# Patient Record
Sex: MALE | Race: WHITE | NOT HISPANIC OR LATINO | Employment: OTHER | URBAN - METROPOLITAN AREA
[De-identification: names, ages, dates, MRNs, and addresses within clinical notes are randomized per-mention and may not be internally consistent; named-entity substitution may affect disease eponyms.]

---

## 2017-01-06 ENCOUNTER — RECORDS - HEALTHEAST (OUTPATIENT)
Dept: LAB | Facility: CLINIC | Age: 82
End: 2017-01-06

## 2017-01-06 LAB
CHOLEST SERPL-MCNC: 166 MG/DL
FASTING STATUS PATIENT QL REPORTED: YES
HDLC SERPL-MCNC: 46 MG/DL
LDLC SERPL CALC-MCNC: 80 MG/DL
PSA SERPL-MCNC: <0.1 NG/ML (ref 0–6.5)
TRIGL SERPL-MCNC: 198 MG/DL

## 2017-11-24 ENCOUNTER — RECORDS - HEALTHEAST (OUTPATIENT)
Dept: LAB | Facility: CLINIC | Age: 82
End: 2017-11-24

## 2017-11-24 LAB
CHOLEST SERPL-MCNC: 170 MG/DL
FASTING STATUS PATIENT QL REPORTED: YES
HDLC SERPL-MCNC: 53 MG/DL
LDLC SERPL CALC-MCNC: 98 MG/DL
PSA SERPL-MCNC: <0.1 NG/ML (ref 0–6.5)
TRIGL SERPL-MCNC: 94 MG/DL

## 2018-11-27 ENCOUNTER — RECORDS - HEALTHEAST (OUTPATIENT)
Dept: LAB | Facility: CLINIC | Age: 83
End: 2018-11-27

## 2018-11-27 LAB
ANION GAP SERPL CALCULATED.3IONS-SCNC: 10 MMOL/L (ref 5–18)
BUN SERPL-MCNC: 19 MG/DL (ref 8–28)
CALCIUM SERPL-MCNC: 9.5 MG/DL (ref 8.5–10.5)
CHLORIDE BLD-SCNC: 104 MMOL/L (ref 98–107)
CHOLEST SERPL-MCNC: 200 MG/DL
CO2 SERPL-SCNC: 27 MMOL/L (ref 22–31)
CREAT SERPL-MCNC: 1.11 MG/DL (ref 0.7–1.3)
FASTING STATUS PATIENT QL REPORTED: YES
GFR SERPL CREATININE-BSD FRML MDRD: >60 ML/MIN/1.73M2
GLUCOSE BLD-MCNC: 104 MG/DL (ref 70–125)
HDLC SERPL-MCNC: 48 MG/DL
LDLC SERPL CALC-MCNC: 116 MG/DL
POTASSIUM BLD-SCNC: 3.9 MMOL/L (ref 3.5–5)
PSA SERPL-MCNC: <0.1 NG/ML (ref 0–6.5)
SODIUM SERPL-SCNC: 141 MMOL/L (ref 136–145)
TRIGL SERPL-MCNC: 178 MG/DL

## 2020-01-09 ENCOUNTER — RECORDS - HEALTHEAST (OUTPATIENT)
Dept: LAB | Facility: CLINIC | Age: 85
End: 2020-01-09

## 2020-01-09 LAB
ANION GAP SERPL CALCULATED.3IONS-SCNC: 9 MMOL/L (ref 5–18)
BUN SERPL-MCNC: 23 MG/DL (ref 8–28)
CALCIUM SERPL-MCNC: 9.1 MG/DL (ref 8.5–10.5)
CHLORIDE BLD-SCNC: 105 MMOL/L (ref 98–107)
CHOLEST SERPL-MCNC: 211 MG/DL
CO2 SERPL-SCNC: 29 MMOL/L (ref 22–31)
CREAT SERPL-MCNC: 1.12 MG/DL (ref 0.7–1.3)
FASTING STATUS PATIENT QL REPORTED: YES
GFR SERPL CREATININE-BSD FRML MDRD: >60 ML/MIN/1.73M2
GLUCOSE BLD-MCNC: 100 MG/DL (ref 70–125)
HDLC SERPL-MCNC: 52 MG/DL
LDLC SERPL CALC-MCNC: 126 MG/DL
POTASSIUM BLD-SCNC: 3.8 MMOL/L (ref 3.5–5)
PSA SERPL-MCNC: <0.1 NG/ML (ref 0–6.5)
SODIUM SERPL-SCNC: 143 MMOL/L (ref 136–145)
TRIGL SERPL-MCNC: 163 MG/DL

## 2020-06-10 ENCOUNTER — RECORDS - HEALTHEAST (OUTPATIENT)
Dept: LAB | Facility: CLINIC | Age: 85
End: 2020-06-10

## 2020-06-10 LAB
ANION GAP SERPL CALCULATED.3IONS-SCNC: 9 MMOL/L (ref 5–18)
BUN SERPL-MCNC: 18 MG/DL (ref 8–28)
CALCIUM SERPL-MCNC: 9.2 MG/DL (ref 8.5–10.5)
CHLORIDE BLD-SCNC: 105 MMOL/L (ref 98–107)
CO2 SERPL-SCNC: 28 MMOL/L (ref 22–31)
CREAT SERPL-MCNC: 1.05 MG/DL (ref 0.7–1.3)
GFR SERPL CREATININE-BSD FRML MDRD: >60 ML/MIN/1.73M2
GLUCOSE BLD-MCNC: 95 MG/DL (ref 70–125)
POTASSIUM BLD-SCNC: 3.9 MMOL/L (ref 3.5–5)
SODIUM SERPL-SCNC: 142 MMOL/L (ref 136–145)

## 2021-04-21 ENCOUNTER — TRANSFERRED RECORDS (OUTPATIENT)
Dept: HEALTH INFORMATION MANAGEMENT | Facility: CLINIC | Age: 86
End: 2021-04-21

## 2021-04-21 ENCOUNTER — RECORDS - HEALTHEAST (OUTPATIENT)
Dept: LAB | Facility: CLINIC | Age: 86
End: 2021-04-21

## 2021-04-21 LAB
ANION GAP SERPL CALCULATED.3IONS-SCNC: 11 MMOL/L (ref 5–18)
BUN SERPL-MCNC: 23 MG/DL (ref 8–28)
CALCIUM SERPL-MCNC: 8.6 MG/DL (ref 8.5–10.5)
CHLORIDE BLD-SCNC: 106 MMOL/L (ref 98–107)
CHOLEST SERPL-MCNC: 153 MG/DL
CO2 SERPL-SCNC: 25 MMOL/L (ref 22–31)
CREAT SERPL-MCNC: 0.97 MG/DL (ref 0.7–1.3)
FASTING STATUS PATIENT QL REPORTED: NO
GFR SERPL CREATININE-BSD FRML MDRD: >60 ML/MIN/1.73M2
GLUCOSE BLD-MCNC: 176 MG/DL (ref 70–125)
HDLC SERPL-MCNC: 45 MG/DL
LDLC SERPL CALC-MCNC: 91 MG/DL
PHQ9 SCORE: 0
POTASSIUM BLD-SCNC: 3.7 MMOL/L (ref 3.5–5)
PSA SERPL-MCNC: <0.1 NG/ML (ref 0–6.5)
SODIUM SERPL-SCNC: 142 MMOL/L (ref 136–145)
TRIGL SERPL-MCNC: 84 MG/DL

## 2021-05-25 ENCOUNTER — RECORDS - HEALTHEAST (OUTPATIENT)
Dept: ADMINISTRATIVE | Facility: CLINIC | Age: 86
End: 2021-05-25

## 2021-05-28 ENCOUNTER — RECORDS - HEALTHEAST (OUTPATIENT)
Dept: ADMINISTRATIVE | Facility: CLINIC | Age: 86
End: 2021-05-28

## 2022-06-26 ENCOUNTER — APPOINTMENT (OUTPATIENT)
Dept: GENERAL RADIOLOGY | Facility: CLINIC | Age: 87
End: 2022-06-26
Attending: EMERGENCY MEDICINE
Payer: COMMERCIAL

## 2022-06-26 ENCOUNTER — APPOINTMENT (OUTPATIENT)
Dept: CT IMAGING | Facility: CLINIC | Age: 87
End: 2022-06-26
Attending: EMERGENCY MEDICINE
Payer: COMMERCIAL

## 2022-06-26 ENCOUNTER — HOSPITAL ENCOUNTER (OUTPATIENT)
Facility: CLINIC | Age: 87
Setting detail: OBSERVATION
Discharge: HOME | End: 2022-06-27
Attending: EMERGENCY MEDICINE | Admitting: PHYSICIAN ASSISTANT
Payer: COMMERCIAL

## 2022-06-26 DIAGNOSIS — R55 SYNCOPE, UNSPECIFIED SYNCOPE TYPE: ICD-10-CM

## 2022-06-26 DIAGNOSIS — Z20.822 COVID-19 RULED OUT BY LABORATORY TESTING: ICD-10-CM
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
## 2022-06-26 DIAGNOSIS — S01.01XA LACERATION OF SCALP, INITIAL ENCOUNTER: ICD-10-CM

## 2022-06-26 LAB
ALBUMIN SERPL BCG-MCNC: 3.9 G/DL (ref 3.5–5.2)
ALBUMIN UR-MCNC: NEGATIVE MG/DL
ALP SERPL-CCNC: 89 U/L (ref 40–129)
ALT SERPL W P-5'-P-CCNC: 17 U/L (ref 10–50)
ANION GAP SERPL CALCULATED.3IONS-SCNC: 13 MMOL/L (ref 7–15)
APPEARANCE UR: CLEAR
AST SERPL W P-5'-P-CCNC: 59 U/L (ref 10–50)
BASOPHILS # BLD AUTO: 0 10E3/UL (ref 0–0.2)
BASOPHILS NFR BLD AUTO: 1 %
BILIRUB DIRECT SERPL-MCNC: <0.2 MG/DL (ref 0–0.3)
BILIRUB SERPL-MCNC: 0.9 MG/DL
BILIRUB UR QL STRIP: NEGATIVE
BUN SERPL-MCNC: 22.1 MG/DL (ref 8–23)
CALCIUM SERPL-MCNC: 9.2 MG/DL (ref 8.2–9.6)
CHLORIDE SERPL-SCNC: 104 MMOL/L (ref 98–107)
COLOR UR AUTO: ABNORMAL
CREAT SERPL-MCNC: 1.28 MG/DL (ref 0.67–1.17)
DEPRECATED HCO3 PLAS-SCNC: 24 MMOL/L (ref 22–29)
EOSINOPHIL # BLD AUTO: 0.1 10E3/UL (ref 0–0.7)
EOSINOPHIL NFR BLD AUTO: 1 %
ERYTHROCYTE [DISTWIDTH] IN BLOOD BY AUTOMATED COUNT: 13.2 % (ref 10–15)
GFR SERPL CREATININE-BSD FRML MDRD: 53 ML/MIN/1.73M2
GLUCOSE SERPL-MCNC: 115 MG/DL (ref 70–99)
GLUCOSE UR STRIP-MCNC: NEGATIVE MG/DL
HCT VFR BLD AUTO: 38.6 % (ref 40–53)
HGB BLD-MCNC: 12.9 G/DL (ref 13.3–17.7)
HGB UR QL STRIP: NEGATIVE
HOLD SPECIMEN: NORMAL
HYALINE CASTS: 1 /LPF
IMM GRANULOCYTES # BLD: 0 10E3/UL
IMM GRANULOCYTES NFR BLD: 1 %
KETONES UR STRIP-MCNC: ABNORMAL MG/DL
LEUKOCYTE ESTERASE UR QL STRIP: NEGATIVE
LYMPHOCYTES # BLD AUTO: 0.9 10E3/UL (ref 0.8–5.3)
LYMPHOCYTES NFR BLD AUTO: 11 %
MAGNESIUM SERPL-MCNC: 2 MG/DL (ref 1.7–2.3)
MCH RBC QN AUTO: 30.4 PG (ref 26.5–33)
MCHC RBC AUTO-ENTMCNC: 33.4 G/DL (ref 31.5–36.5)
MCV RBC AUTO: 91 FL (ref 78–100)
MONOCYTES # BLD AUTO: 0.6 10E3/UL (ref 0–1.3)
MONOCYTES NFR BLD AUTO: 7 %
MUCOUS THREADS #/AREA URNS LPF: PRESENT /LPF
NEUTROPHILS # BLD AUTO: 6.4 10E3/UL (ref 1.6–8.3)
NEUTROPHILS NFR BLD AUTO: 79 %
NITRATE UR QL: NEGATIVE
NRBC # BLD AUTO: 0 10E3/UL
NRBC BLD AUTO-RTO: 0 /100
PH UR STRIP: 6.5 [PH] (ref 5–7)
PHOSPHATE SERPL-MCNC: 3.2 MG/DL (ref 2.5–4.5)
PLATELET # BLD AUTO: 191 10E3/UL (ref 150–450)
POTASSIUM SERPL-SCNC: 3.9 MMOL/L (ref 3.4–5.3)
PROT SERPL-MCNC: 7 G/DL (ref 6.4–8.3)
RBC # BLD AUTO: 4.25 10E6/UL (ref 4.4–5.9)
RBC URINE: 1 /HPF
SARS-COV-2 RNA RESP QL NAA+PROBE: NEGATIVE
SODIUM SERPL-SCNC: 141 MMOL/L (ref 136–145)
SP GR UR STRIP: 1.01 (ref 1–1.03)
TROPONIN T SERPL HS-MCNC: 26 NG/L
TROPONIN T SERPL HS-MCNC: 26 NG/L
TROPONIN T SERPL HS-MCNC: 28 NG/L
TSH SERPL DL<=0.005 MIU/L-ACNC: 3.75 UIU/ML (ref 0.3–4.2)
UROBILINOGEN UR STRIP-MCNC: NORMAL MG/DL
WBC # BLD AUTO: 7.9 10E3/UL (ref 4–11)
WBC URINE: 4 /HPF

## 2022-06-26 PROCEDURE — 93005 ELECTROCARDIOGRAM TRACING: CPT | Performed by: EMERGENCY MEDICINE

## 2022-06-26 PROCEDURE — 99285 EMERGENCY DEPT VISIT HI MDM: CPT | Mod: 25 | Performed by: EMERGENCY MEDICINE

## 2022-06-26 PROCEDURE — 71045 X-RAY EXAM CHEST 1 VIEW: CPT

## 2022-06-26 PROCEDURE — 80076 HEPATIC FUNCTION PANEL: CPT | Performed by: PHYSICIAN ASSISTANT

## 2022-06-26 PROCEDURE — 99219 PR INITIAL OBSERVATION CARE,LEVEL II: CPT | Performed by: PHYSICIAN ASSISTANT

## 2022-06-26 PROCEDURE — 72125 CT NECK SPINE W/O DYE: CPT

## 2022-06-26 PROCEDURE — 36415 COLL VENOUS BLD VENIPUNCTURE: CPT | Performed by: PHYSICIAN ASSISTANT

## 2022-06-26 PROCEDURE — 84484 ASSAY OF TROPONIN QUANT: CPT | Performed by: PHYSICIAN ASSISTANT

## 2022-06-26 PROCEDURE — G0378 HOSPITAL OBSERVATION PER HR: HCPCS

## 2022-06-26 PROCEDURE — 72125 CT NECK SPINE W/O DYE: CPT | Mod: 26 | Performed by: RADIOLOGY

## 2022-06-26 PROCEDURE — 12001 RPR S/N/AX/GEN/TRNK 2.5CM/<: CPT | Performed by: EMERGENCY MEDICINE

## 2022-06-26 PROCEDURE — 93010 ELECTROCARDIOGRAM REPORT: CPT | Mod: 59 | Performed by: EMERGENCY MEDICINE

## 2022-06-26 PROCEDURE — 85025 COMPLETE CBC W/AUTO DIFF WBC: CPT | Performed by: EMERGENCY MEDICINE

## 2022-06-26 PROCEDURE — 96360 HYDRATION IV INFUSION INIT: CPT | Performed by: EMERGENCY MEDICINE

## 2022-06-26 PROCEDURE — 84443 ASSAY THYROID STIM HORMONE: CPT | Performed by: PHYSICIAN ASSISTANT

## 2022-06-26 PROCEDURE — 36415 COLL VENOUS BLD VENIPUNCTURE: CPT | Performed by: EMERGENCY MEDICINE

## 2022-06-26 PROCEDURE — 84484 ASSAY OF TROPONIN QUANT: CPT | Performed by: EMERGENCY MEDICINE

## 2022-06-26 PROCEDURE — U0005 INFEC AGEN DETEC AMPLI PROBE: HCPCS | Performed by: EMERGENCY MEDICINE

## 2022-06-26 PROCEDURE — 71045 X-RAY EXAM CHEST 1 VIEW: CPT | Mod: 26 | Performed by: RADIOLOGY

## 2022-06-26 PROCEDURE — 93005 ELECTROCARDIOGRAM TRACING: CPT | Mod: 76 | Performed by: EMERGENCY MEDICINE

## 2022-06-26 PROCEDURE — 70450 CT HEAD/BRAIN W/O DYE: CPT | Mod: 26 | Performed by: RADIOLOGY

## 2022-06-26 PROCEDURE — 81001 URINALYSIS AUTO W/SCOPE: CPT | Performed by: EMERGENCY MEDICINE

## 2022-06-26 PROCEDURE — 250N000013 HC RX MED GY IP 250 OP 250 PS 637: Performed by: PHYSICIAN ASSISTANT

## 2022-06-26 PROCEDURE — 83735 ASSAY OF MAGNESIUM: CPT | Performed by: PHYSICIAN ASSISTANT

## 2022-06-26 PROCEDURE — 82310 ASSAY OF CALCIUM: CPT | Performed by: EMERGENCY MEDICINE

## 2022-06-26 PROCEDURE — 70450 CT HEAD/BRAIN W/O DYE: CPT

## 2022-06-26 PROCEDURE — 84100 ASSAY OF PHOSPHORUS: CPT | Performed by: PHYSICIAN ASSISTANT

## 2022-06-26 PROCEDURE — C9803 HOPD COVID-19 SPEC COLLECT: HCPCS | Performed by: EMERGENCY MEDICINE

## 2022-06-26 PROCEDURE — 258N000003 HC RX IP 258 OP 636: Performed by: EMERGENCY MEDICINE

## 2022-06-26 RX ORDER — NITROGLYCERIN 0.4 MG/1
0.4 TABLET SUBLINGUAL EVERY 5 MIN PRN
Status: DISCONTINUED | OUTPATIENT
Start: 2022-06-26 | End: 2022-06-27 | Stop reason: HOSPADM

## 2022-06-26 RX ORDER — ONDANSETRON 2 MG/ML
4 INJECTION INTRAMUSCULAR; INTRAVENOUS EVERY 6 HOURS PRN
Status: DISCONTINUED | OUTPATIENT
Start: 2022-06-26 | End: 2022-06-27 | Stop reason: HOSPADM

## 2022-06-26 RX ORDER — ACETAMINOPHEN 325 MG/1
650 TABLET ORAL EVERY 4 HOURS PRN
Status: DISCONTINUED | OUTPATIENT
Start: 2022-06-26 | End: 2022-06-27 | Stop reason: HOSPADM

## 2022-06-26 RX ORDER — AMLODIPINE BESYLATE 10 MG/1
1 TABLET ORAL DAILY
COMMUNITY

## 2022-06-26 RX ORDER — AMLODIPINE BESYLATE 10 MG/1
10 TABLET ORAL DAILY
Status: DISCONTINUED | OUTPATIENT
Start: 2022-06-26 | End: 2022-06-27 | Stop reason: HOSPADM

## 2022-06-26 RX ORDER — SIMVASTATIN 10 MG
20 TABLET ORAL AT BEDTIME
Status: DISCONTINUED | OUTPATIENT
Start: 2022-06-26 | End: 2022-06-27 | Stop reason: HOSPADM

## 2022-06-26 RX ORDER — DORZOLAMIDE HYDROCHLORIDE AND TIMOLOL MALEATE PRESERVATIVE FREE 20; 5 MG/ML; MG/ML
1 SOLUTION/ DROPS OPHTHALMIC 2 TIMES DAILY
Status: DISCONTINUED | OUTPATIENT
Start: 2022-06-26 | End: 2022-06-27 | Stop reason: HOSPADM

## 2022-06-26 RX ORDER — LIDOCAINE 40 MG/G
CREAM TOPICAL
Status: DISCONTINUED | OUTPATIENT
Start: 2022-06-26 | End: 2022-06-27 | Stop reason: HOSPADM

## 2022-06-26 RX ORDER — ONDANSETRON 4 MG/1
4 TABLET, ORALLY DISINTEGRATING ORAL EVERY 6 HOURS PRN
Status: DISCONTINUED | OUTPATIENT
Start: 2022-06-26 | End: 2022-06-27 | Stop reason: HOSPADM

## 2022-06-26 RX ORDER — FLUOXETINE 10 MG/1
10 CAPSULE ORAL DAILY
Status: DISCONTINUED | OUTPATIENT
Start: 2022-06-27 | End: 2022-06-26

## 2022-06-26 RX ORDER — DORZOLAMIDE HYDROCHLORIDE AND TIMOLOL MALEATE PRESERVATIVE FREE 20; 5 MG/ML; MG/ML
1 SOLUTION/ DROPS OPHTHALMIC 2 TIMES DAILY
COMMUNITY

## 2022-06-26 RX ORDER — SIMVASTATIN 20 MG
1 TABLET ORAL AT BEDTIME
COMMUNITY

## 2022-06-26 RX ORDER — FLUOXETINE 10 MG/1
10 CAPSULE ORAL EVERY OTHER DAY
Status: DISCONTINUED | OUTPATIENT
Start: 2022-06-27 | End: 2022-06-27 | Stop reason: HOSPADM

## 2022-06-26 RX ORDER — ASPIRIN 81 MG/1
81 TABLET ORAL DAILY
Status: DISCONTINUED | OUTPATIENT
Start: 2022-06-27 | End: 2022-06-27 | Stop reason: HOSPADM

## 2022-06-26 RX ORDER — NITROGLYCERIN 0.4 MG/1
1 TABLET SUBLINGUAL PRN
COMMUNITY

## 2022-06-26 RX ORDER — LATANOPROST 50 UG/ML
1 SOLUTION/ DROPS OPHTHALMIC AT BEDTIME
Status: DISCONTINUED | OUTPATIENT
Start: 2022-06-26 | End: 2022-06-27 | Stop reason: HOSPADM

## 2022-06-26 RX ORDER — ACETAMINOPHEN 650 MG/1
650 SUPPOSITORY RECTAL EVERY 4 HOURS PRN
Status: DISCONTINUED | OUTPATIENT
Start: 2022-06-26 | End: 2022-06-27 | Stop reason: HOSPADM

## 2022-06-26 RX ORDER — ASPIRIN 81 MG/1
1 TABLET ORAL DAILY
COMMUNITY

## 2022-06-26 RX ORDER — SODIUM CHLORIDE 9 MG/ML
INJECTION, SOLUTION INTRAVENOUS CONTINUOUS
Status: DISCONTINUED | OUTPATIENT
Start: 2022-06-26 | End: 2022-06-27 | Stop reason: HOSPADM

## 2022-06-26 RX ORDER — LATANOPROST 50 UG/ML
1 SOLUTION/ DROPS OPHTHALMIC AT BEDTIME
COMMUNITY

## 2022-06-26 RX ORDER — FLUOXETINE 10 MG/1
1 CAPSULE ORAL EVERY OTHER DAY
COMMUNITY

## 2022-06-26 RX ORDER — ANTIOX #8/OM3/DHA/EPA/LUT/ZEAX 250-2.5 MG
1 CAPSULE ORAL 2 TIMES DAILY
COMMUNITY

## 2022-06-26 RX ADMIN — SODIUM CHLORIDE 1000 ML: 9 INJECTION, SOLUTION INTRAVENOUS at 18:18

## 2022-06-26 RX ADMIN — SIMVASTATIN 20 MG: 20 TABLET, FILM COATED ORAL at 22:06

## 2022-06-26 RX ADMIN — AMLODIPINE BESYLATE 10 MG: 10 TABLET ORAL at 20:47

## 2022-06-26 NOTE — ED NOTES
Bed: ED01  Expected date: 6/26/22  Expected time: 3:41 PM  Means of arrival: Ambulance  Comments:  [address]    89 y/o Male    Fall  Head injury/head laceration  Unknown thinners

## 2022-06-26 NOTE — ED PROVIDER NOTES
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 Per patient's wife, he left his house and was walking around outside for 3 hours when a passerby found him lying on the street (fall was not witnessed). Patient is unstable when walking. Patient suffered a laceration to the occipital region of his head. Patient is unable to provide thorough history due to dementia and does not remember the fall. Says he is feeling okay right now and is not having any pain.  No fevers.    Past Medical History  Past Medical History:   Diagnosis Date     Dementia (H)      No past surgical history on file.  No current outpatient medications on file.    Allergies   Allergen Reactions     Lisinopril-Hydrochlorothiazide Unknown     Family History  No family history on file.  Social History       Past medical history, past surgical history, medications, allergies, family history, and social history were reviewed with the patient. No additional pertinent items.       Review of Systems   Unable to perform ROS: Dementia     A complete review of systems was performed with pertinent positives and negatives noted in the HPI, and all other systems negative.    Physical Exam   BP: (!) 166/80  Pulse: 77  Temp: 98.6  F (37  C)  Resp: 15  SpO2: 98 %  Physical Exam  Vitals and nursing note reviewed.   Constitutional:       General: He is not in acute distress.     Appearance: Normal appearance. He is not ill-appearing, toxic-appearing or diaphoretic.   HENT:      Head: Normocephalic.      Comments: 2 cm linear laceration in the occipital region without galea exposure without active bleeding.  No foreign body in the wound.     Mouth/Throat:       Mouth: Mucous membranes are moist.      Pharynx: Oropharynx is clear.   Eyes:      Extraocular Movements: Extraocular movements intact.      Conjunctiva/sclera: Conjunctivae normal.      Pupils: Pupils are equal, round, and reactive to light.   Cardiovascular:      Rate and Rhythm: Normal rate and regular rhythm.      Pulses: Normal pulses.           Radial pulses are 2+ on the right side and 2+ on the left side.        Dorsalis pedis pulses are 2+ on the right side and 2+ on the left side.        Posterior tibial pulses are 2+ on the right side and 2+ on the left side.      Heart sounds: Normal heart sounds.   Pulmonary:      Effort: Pulmonary effort is normal. No respiratory distress.      Breath sounds: Normal breath sounds.   Chest:      Chest wall: No tenderness.   Abdominal:      General: Abdomen is flat.      Palpations: Abdomen is soft.      Tenderness: There is no abdominal tenderness. There is no guarding or rebound.   Musculoskeletal:         General: No tenderness. Normal range of motion.      Cervical back: Full passive range of motion without pain, normal range of motion and neck supple. No rigidity or tenderness. No pain with movement, spinous process tenderness or muscular tenderness. Normal range of motion.      Thoracic back: No tenderness.      Lumbar back: No tenderness.      Comments: No midline cervical, thoracic, lumbar tenderness.  No tenderness over hips.  Full range of motion, active and passive, in shoulders, elbows, wrists, hips, knees, and ankles without pain.   Skin:     General: Skin is warm.      Capillary Refill: Capillary refill takes less than 2 seconds.      Findings: No abrasion or laceration.   Neurological:      General: No focal deficit present.      Mental Status: He is alert.      GCS: GCS eye subscore is 4. GCS verbal subscore is 4. GCS motor subscore is 6.      Cranial Nerves: No cranial nerve deficit.      Sensory: Sensation is intact. No sensory deficit.      Motor:  Motor function is intact. No weakness.      Coordination: Coordination is intact. Coordination normal.      Gait: Gait is intact.      Comments: Patient oriented to place, person.   Psychiatric:         Mood and Affect: Mood normal.         Behavior: Behavior normal.         ED Course     3:44 PM  The patient was seen and examined by Dr. Celine Tan MD in Room ED01.       Procedures       Narrative: Procedure: Laceration Repair        LACERATION:  A simple and superficial clean 2 cm laceration.      LOCATION:  Posterior scalp      FUNCTION:  Distally sensation, circulation, motor and tendon function are intact.      ANESTHESIA:  None      PREPARATION:  Irrigation with Normal Saline      DEBRIDEMENT:  no debridement and wound explored, no foreign body found      CLOSURE:  Wound was closed with 2 Staples                  EKG Interpretation:      Interpreted by Celine Tan MD  Time reviewed: 4:23 PM  Symptoms at time of EKG: Possible syncope  Rhythm: normal sinus   Rate: normal  Axis: normal  Ectopy: none  Conduction: normal  ST Segments/ T Waves: T wave inversions in leads III and aVF  Q Waves: none  Comparison to prior: Slightly different than old EKG done on July 25, 2013    Clinical Impression: NSR with likely chronic ischemic changes in inferior leads                    Results for orders placed or performed during the hospital encounter of 06/26/22   XR Chest Port 1 View     Status: None    Narrative    EXAM: XR CHEST PORT 1 VIEW  6/26/2022 4:12 PM     HISTORY:  fall, trauma       COMPARISON:  None    FINDINGS: Single view of the chest.    Trachea is midline. The cardiomediastinal silhouette is within normal  limits. No significant pleural effusion or pneumothorax. No focal  airspace opacity. The visualized upper abdomen is unremarkable. No  acute osseous injury.      Impression    IMPRESSION: No acute airspace disease.    I have personally reviewed the examination and initial interpretation  and I agree  with the findings.    JUNIE HARRIS MD         SYSTEM ID:  [id number]   CT Head w/o Contrast     Status: None    Narrative    CT HEAD W/O CONTRAST 6/26/2022 4:40 PM    History: fall, head trauma     Comparison: None    Technique: Using multidetector thin collimation helical acquisition  technique, axial, coronal and sagittal CT images from the skull base  to the vertex were obtained without intravenous contrast.   (topogram) image(s) also obtained and reviewed.    Findings: There is no intracranial hemorrhage, mass effect, or midline  shift. Gray/white matter differentiation in both cerebral hemispheres  is preserved. Ventricles are proportionate to the cerebral sulci. The  basal cisterns are clear. Low-attenuation in the periventricular and  subcortical white matter, likely representing chronic small vessel  ischemic disease. Moderate diffuse cerebral volume loss.    Scalp hematoma overlying the right parietal bone. The bony calvaria  and the bones of the skull base are normal. The visualized portions of  the paranasal sinuses and mastoid air cells are clear.      Impression    Impression: No acute intracranial pathology.     I have personally reviewed the examination and initial interpretation  and I agree with the findings.    CHI HOFFMANN MD         SYSTEM ID:  [id number]   Cervical spine CT w/o contrast     Status: None    Narrative    CT CERVICAL SPINE W/O CONTRAST 6/26/2022 4:40 PM    Provided History: fall, possible c-sine injury, evaluate for c-spine  fracture    Comparison: None    Technique: Using multidetector thin collimation helical acquisition  technique, axial, coronal and sagittal CT images through the cervical  spine were obtained without intravenous contrast.     Findings:  Grade 1 anterolisthesis of C4 on C5 and C7 on T1. Accentuation of the  normal cervical lordosis. No acute fracture or subluxation. No  prevertebral edema. There is no disc height narrowing at any level.  The findings on a  level by level basis are as follows:    C2-3:  No significant spinal canal or neural foraminal stenosis.    C3-4:  Bilateral uncinate spurring and facet arthropathy. Moderate  right and moderate-severe left neural foraminal stenosis. Mild spinal  canal stenosis.    C4-5:  Bilateral uncinate spurring and facet arthropathy. Moderate  right and mild left neural foraminal stenosis. No significant spinal  canal stenosis    C5-6:  Posterior disc osteophyte complex with bilateral uncinate  spurring and facet arthropathy. Moderate-severe right and moderate  left neural foraminal stenosis. Mild spinal canal stenosis.    C6-7:  Posterior disc osteophyte complex with bilateral uncinate  spurring and facet arthropathy. Severe right and moderate-severe left  neural foraminal stenosis. Mild-moderate spinal canal stenosis    C7-T1:  No significant spinal canal or neural foraminal stenosis     No abnormality of the paraspinous soft tissues.      Impression    Impression:  No acute fracture or traumatic subluxation of the  cervical spine.    I have personally reviewed the examination and initial interpretation  and I agree with the findings.    CHI HOFFMANN MD         SYSTEM ID:  [id number]   Basic metabolic panel     Status: Abnormal   Result Value Ref Range    Creatinine 1.28 (H) 0.67 - 1.17 mg/dL    Sodium 141 136 - 145 mmol/L    Potassium 3.9 3.4 - 5.3 mmol/L    Urea Nitrogen 22.1 8.0 - 23.0 mg/dL    Chloride 104 98 - 107 mmol/L    Carbon Dioxide (CO2) 24 22 - 29 mmol/L    Anion Gap 13 7 - 15 mmol/L    Glucose 115 (H) 70 - 99 mg/dL    GFR Estimate 53 (L) >60 mL/min/1.73m2    Calcium 9.2 8.2 - 9.6 mg/dL   Troponin T, High Sensitivity     Status: Abnormal   Result Value Ref Range    Troponin T, High Sensitivity 28 (H) <=22 ng/L   UA with Microscopic reflex to Culture     Status: Abnormal    Specimen: Urine, Clean Catch   Result Value Ref Range    Color Urine Light Yellow Colorless, Straw, Light Yellow, Yellow    Appearance Urine  Clear Clear    Glucose Urine Negative Negative mg/dL    Bilirubin Urine Negative Negative    Ketones Urine Trace (A) Negative mg/dL    Specific Gravity Urine 1.012 1.003 - 1.035    Blood Urine Negative Negative    pH Urine 6.5 5.0 - 7.0    Protein Albumin Urine Negative Negative mg/dL    Urobilinogen Urine Normal Normal, 2.0 mg/dL    Nitrite Urine Negative Negative    Leukocyte Esterase Urine Negative Negative    Mucus Urine Present (A) None Seen /LPF    RBC Urine 1 <=2 /HPF    WBC Urine 4 <=5 /HPF    Hyaline Casts Urine 1 <=2 /LPF    Narrative    Urine Culture not indicated   [place] Draw     Status: None    Narrative    The following orders were created for panel order [place] Draw.  Procedure                               Abnormality         Status                     ---------                               -----------         ------                     Extra Blue Top Tube[[id number]]                              Final result                 Please view results for these tests on the individual orders.   CBC with platelets and differential     Status: Abnormal   Result Value Ref Range    WBC Count 7.9 4.0 - 11.0 10e3/uL    RBC Count 4.25 (L) 4.40 - 5.90 10e6/uL    Hemoglobin 12.9 (L) 13.3 - 17.7 g/dL    Hematocrit 38.6 (L) 40.0 - 53.0 %    MCV 91 78 - 100 fL    MCH 30.4 26.5 - 33.0 pg    MCHC 33.4 31.5 - 36.5 g/dL    RDW 13.2 10.0 - 15.0 %    Platelet Count 191 150 - 450 10e3/uL    % Neutrophils 79 %    % Lymphocytes 11 %    % Monocytes 7 %    % Eosinophils 1 %    % Basophils 1 %    % Immature Granulocytes 1 %    NRBCs per 100 WBC 0 <1 /100    Absolute Neutrophils 6.4 1.6 - 8.3 10e3/uL    Absolute Lymphocytes 0.9 0.8 - 5.3 10e3/uL    Absolute Monocytes 0.6 0.0 - 1.3 10e3/uL    Absolute Eosinophils 0.1 0.0 - 0.7 10e3/uL    Absolute Basophils 0.0 0.0 - 0.2 10e3/uL    Absolute Immature Granulocytes 0.0 <=0.4 10e3/uL    Absolute NRBCs 0.0 10e3/uL   Extra Blue Top Tube     Status: None   Result Value Ref Range    Hold  Specimen JI    Troponin T, High Sensitivity     Status: Abnormal   Result Value Ref Range    Troponin T, High Sensitivity 26 (H) <=22 ng/L   Asymptomatic COVID-19 Virus (Coronavirus) by PCR Nasopharyngeal     Status: Normal    Specimen: Nasopharyngeal; Swab   Result Value Ref Range    SARS CoV2 PCR Negative Negative, Testing sent to reference lab. Results will be returned via unsolicited result    Narrative    Testing was performed using the Xpert Xpress SARS-CoV-2 Assay on the  Cepheid Gene-Xpert Instrument Systems. Additional information about  this Emergency Use Authorization (EUA) assay can be found via the Lab  Guide. This test should be ordered for the detection of SARS-CoV-2 in  individuals who meet SARS-CoV-2 clinical and/or epidemiological  criteria. Test performance is unknown in asymptomatic patients. This  test is for in vitro diagnostic use under the FDA EUA for  laboratories certified under CLIA to perform high complexity testing.  This test has not been FDA cleared or approved. A negative result  does not rule out the presence of PCR inhibitors in the specimen or  target RNA in concentration below the limit of detection for the  assay. The possibility of a false negative should be considered if  the patient's recent exposure or clinical presentation suggests  COVID-19. This test was validated by the [facility]. This laboratory is certified under  the Clinical Laboratory Improvement Amendments of 1988 (CLIA-88) as  qualified to perform high complexity laboratory testing.     Troponin T, High Sensitivity     Status: Abnormal   Result Value Ref Range    Troponin T, High Sensitivity 26 (H) <=22 ng/L   Magnesium     Status: Normal   Result Value Ref Range    Magnesium 2.0 1.7 - 2.3 mg/dL   TSH with free T4 reflex     Status: Normal   Result Value Ref Range    TSH 3.75 0.30 - 4.20 uIU/mL   EKG 12-lead, tracing only     Status: None (Preliminary result)    Result Value Ref Range    Systolic Blood Pressure  mmHg    Diastolic Blood Pressure  mmHg    Ventricular Rate 70 BPM    Atrial Rate 70 BPM    NV Interval 126 ms    QRS Duration 96 ms     ms    QTc 460 ms    P Axis 46 degrees    R AXIS 37 degrees    T Axis -29 degrees    Interpretation ECG       Sinus rhythm with Premature supraventricular complexes  T wave abnormality, consider inferior ischemia  Abnormal ECG     EKG 12-lead, tracing only     Status: None (Preliminary result)   Result Value Ref Range    Systolic Blood Pressure  mmHg    Diastolic Blood Pressure  mmHg    Ventricular Rate 64 BPM    Atrial Rate 64 BPM    NV Interval 118 ms    QRS Duration 102 ms     ms    QTc 460 ms    P Axis 55 degrees    R AXIS 62 degrees    T Axis -17 degrees    Interpretation ECG       Sinus rhythm  ST & T wave abnormality, consider inferior ischemia  Abnormal ECG     CBC with platelets differential     Status: Abnormal    Narrative    The following orders were created for panel order CBC with platelets differential.  Procedure                               Abnormality         Status                     ---------                               -----------         ------                     CBC with platelets and d...[[id number]]  Abnormal            Final result                 Please view results for these tests on the individual orders.     Medications   amLODIPine (NORVASC) tablet 10 mg (10 mg Oral Given 6/26/22 2047)   aspirin EC tablet 81 mg (has no administration in time range)   dorzolamide-timolol PF (COSOPT PF) opthalmic solution 1 drop (has no administration in time range)   latanoprost (XALATAN) 0.005 % ophthalmic solution 1 drop (has no administration in time range)   simvastatin (ZOCOR) tablet 20 mg (20 mg Oral Given 6/26/22 2206)   lidocaine 1 % 0.1-1 mL (has no administration in time range)   lidocaine (LMX4) cream (has no administration in time range)   sodium chloride (PF) 0.9% PF flush 3 mL (has no  administration in time range)   sodium chloride (PF) 0.9% PF flush 3 mL (has no administration in time range)   nitroGLYcerin (NITROSTAT) sublingual tablet 0.4 mg (has no administration in time range)   ondansetron (ZOFRAN ODT) ODT tab 4 mg (has no administration in time range)     Or   ondansetron (ZOFRAN) injection 4 mg (has no administration in time range)   acetaminophen (TYLENOL) tablet 650 mg (has no administration in time range)   acetaminophen (TYLENOL) Suppository 650 mg (has no administration in time range)   sodium chloride 0.9% infusion (has no administration in time range)   FLUoxetine (PROzac) capsule 10 mg (has no administration in time range)   0.9% sodium chloride BOLUS (0 mLs Intravenous Stopped 6/26/22 1930)        Assessments & Plan (with Medical Decision Making)   Enoc Vásquez is a 89-year-old man brought in by ambulance after being found down outside on the ground.  Differential diagnosis: Intracranial hemorrhage, skull fracture, laceration, C-spine injury, syncope, arrhythmia, ACS, electrolyte abnormalities, dehydration, UTI.    After thorough history and physical exam patient appears to be in no acute distress. I will obtain laboratory studies and CT of the head and cervical spine for further diagnostic evaluation along with an EKG.  Patient agrees with the plan.  However, patient does have underlying dementia but he appears in no acute distress whatsoever.    Laboratory studies returned without leukocytosis, WBC is normal at 7900.  There is mild anemia with hemoglobin of 12.9.  Electrolytes show evidence of dehydration with creatinine of 1.28 which is higher than patient's baseline.  He will be hydrated with a bolus of intravenous normal saline.  High-sensitivity troponin is elevated at 28.  I reviewed his CT of the head and I read the radiology report; no evidence of intracranial hemorrhage or skull fracture.  I also reviewed a CT of the cervical spine and I read the radiology report; no  evidence of spondylolisthesis or C-spine fracture.  His wound was irrigated, cleaned, and his laceration was repaired utilizing staples.  Delta troponin was obtained and it returned at 26.  Patient and his wife were informed of the results.  Given the uncertain nature of his fall which could have resulted from syncopal event at this point he will be admitted to the observation unit for further evaluation management.  Patient and wife agree with the plan.    This part of the document was transcribed by Viry Cervantes, Medical Scribe.      I have reviewed the nursing notes. I have reviewed the findings, diagnosis, plan and need for follow up with the patient.    Current Discharge Medication List          Final diagnoses:   Fall, initial encounter   Laceration of scalp, initial encounter   Syncope, unspecified syncope type   I, Amber Mendoza, am serving as a trained medical scribe to document services personally performed by Celine Tan MD, based on the provider's statements to me.     I, Celine Tan MD, was physically present and have reviewed and verified the accuracy of this note documented by Amber Mendoza.      --  Celine Tan MD  [facility] EMERGENCY DEPARTMENT  6/26/2022     Celine Tan MD  06/26/22 2672

## 2022-06-26 NOTE — ED TRIAGE NOTES
Pt BIBA where they found him fallen on the street where a bystander called 911. EMS reports patient has severe dementia and likes to wonder around the neighbor. EMS reports that the patients wife said he was wondering around for three hours. Unsure if patient is on blood thinners.

## 2022-06-27 ENCOUNTER — APPOINTMENT (OUTPATIENT)
Dept: CARDIOLOGY | Facility: CLINIC | Age: 87
End: 2022-06-27
Payer: COMMERCIAL

## 2022-06-27 ENCOUNTER — APPOINTMENT (OUTPATIENT)
Dept: CARDIOLOGY | Facility: CLINIC | Age: 87
End: 2022-06-27
Attending: NURSE PRACTITIONER
Payer: COMMERCIAL

## 2022-06-27 ENCOUNTER — APPOINTMENT (OUTPATIENT)
Dept: PHYSICAL THERAPY | Facility: CLINIC | Age: 87
End: 2022-06-27
Payer: COMMERCIAL

## 2022-06-27 VITALS
RESPIRATION RATE: 18 BRPM | SYSTOLIC BLOOD PRESSURE: 141 MMHG | OXYGEN SATURATION: 97 % | DIASTOLIC BLOOD PRESSURE: 77 MMHG | TEMPERATURE: 97.6 F | HEART RATE: 66 BPM

## 2022-06-27 LAB
ATRIAL RATE - MUSE: 64 BPM
ATRIAL RATE - MUSE: 70 BPM
DIASTOLIC BLOOD PRESSURE - MUSE: NORMAL MMHG
DIASTOLIC BLOOD PRESSURE - MUSE: NORMAL MMHG
INTERPRETATION ECG - MUSE: NORMAL
INTERPRETATION ECG - MUSE: NORMAL
LVEF ECHO: NORMAL
P AXIS - MUSE: 46 DEGREES
P AXIS - MUSE: 55 DEGREES
PR INTERVAL - MUSE: 118 MS
PR INTERVAL - MUSE: 126 MS
QRS DURATION - MUSE: 102 MS
QRS DURATION - MUSE: 96 MS
QT - MUSE: 426 MS
QT - MUSE: 446 MS
QTC - MUSE: 460 MS
QTC - MUSE: 460 MS
R AXIS - MUSE: 37 DEGREES
R AXIS - MUSE: 62 DEGREES
SYSTOLIC BLOOD PRESSURE - MUSE: NORMAL MMHG
SYSTOLIC BLOOD PRESSURE - MUSE: NORMAL MMHG
T AXIS - MUSE: -17 DEGREES
T AXIS - MUSE: -29 DEGREES
VENTRICULAR RATE- MUSE: 64 BPM
VENTRICULAR RATE- MUSE: 70 BPM

## 2022-06-27 PROCEDURE — 999N000111 HC STATISTIC OT IP EVAL DEFER

## 2022-06-27 PROCEDURE — 999N000128 HC STATISTIC PERIPHERAL IV START W/O US GUIDANCE

## 2022-06-27 PROCEDURE — 93242 EXT ECG>48HR<7D RECORDING: CPT

## 2022-06-27 PROCEDURE — G0378 HOSPITAL OBSERVATION PER HR: HCPCS

## 2022-06-27 PROCEDURE — 258N000003 HC RX IP 258 OP 636: Performed by: PHYSICIAN ASSISTANT

## 2022-06-27 PROCEDURE — 93306 TTE W/DOPPLER COMPLETE: CPT

## 2022-06-27 PROCEDURE — 93248 EXT ECG>7D<15D REV&INTERPJ: CPT | Performed by: INTERNAL MEDICINE

## 2022-06-27 PROCEDURE — 97530 THERAPEUTIC ACTIVITIES: CPT | Mod: GP

## 2022-06-27 PROCEDURE — 93306 TTE W/DOPPLER COMPLETE: CPT | Mod: 26 | Performed by: INTERNAL MEDICINE

## 2022-06-27 PROCEDURE — 250N000013 HC RX MED GY IP 250 OP 250 PS 637: Performed by: PHYSICIAN ASSISTANT

## 2022-06-27 PROCEDURE — 99217 PR OBSERVATION CARE DISCHARGE: CPT | Performed by: NURSE PRACTITIONER

## 2022-06-27 PROCEDURE — 97161 PT EVAL LOW COMPLEX 20 MIN: CPT | Mod: GP

## 2022-06-27 PROCEDURE — 97116 GAIT TRAINING THERAPY: CPT | Mod: GP

## 2022-06-27 RX ORDER — HYDRALAZINE HYDROCHLORIDE 20 MG/ML
5 INJECTION INTRAMUSCULAR; INTRAVENOUS EVERY 4 HOURS PRN
Status: DISCONTINUED | OUTPATIENT
Start: 2022-06-27 | End: 2022-06-27 | Stop reason: HOSPADM

## 2022-06-27 RX ADMIN — SODIUM CHLORIDE: 9 INJECTION, SOLUTION INTRAVENOUS at 02:38

## 2022-06-27 RX ADMIN — AMLODIPINE BESYLATE 10 MG: 10 TABLET ORAL at 09:58

## 2022-06-27 RX ADMIN — FLUOXETINE 10 MG: 10 CAPSULE ORAL at 09:57

## 2022-06-27 RX ADMIN — ASPIRIN 81 MG: 81 TABLET, COATED ORAL at 09:58

## 2022-06-27 ASSESSMENT — ACTIVITIES OF DAILY LIVING (ADL): DEPENDENT_IADLS:: MEDICATION MANAGEMENT

## 2022-06-27 NOTE — H&P
"[facility]    History and Physical - ED Observation Service      Date of Admission:  6/26/2022    Assessment & Plan      Enoc Vásquez is a 89 year old male admitted on 6/26/2022. He has a PMH of dementia, HLD, HTN, CAD involving native coronary artery of native heart without angina pectoris, prostate cancer, dysthymia, and glaucoma who presents to the ED following a fall.    ##. Fall  ##. Scalp Laceration  Per patient's wife, patient left his house and was walking around outside for 3 hours when a passerby found him lying on the street (fall was not witnessed). Patient suffered a laceration to the occipital region of his head. Patient does not remember the fall. Patient has no complaints at the moment. Patient otherwise denies any headache, chest pain, changes in vision, dizziness, lightheadedness, SOB, joint pains. Daughter acknowledges patients recent worsening in memory for which he was seen at his PCP (Ameena) about a year ago and did \"ok\" with clinic dementia testing. Was referred to neuropsychiatry testing this has not been done. Had 1 other fall ~3 months ago and scraped the side of his cheeks. Patients daughter states they have had some difficult discussions about patient needing a higher level of care with declining memory and patient has been resistant to leaving his home and his wife has been resistant in the past to having a stranger in their home helping. Patients daughter states that patient does not use any assistive devices in walking. She states patient has been good at taking purposeful walks but occasionally seems to wonder off. In ED, HR 60's-70's, 's-190's/80's-100's, RR 8-15, SaO2 97-99% on RA, Temp 98.6  F. Labs show normal BMP with BUN 22.1, Cr 1.28 otherwise normal. CBC with H/H 12.9/38.6 otherwise normal. UA with trace ketones otherwise negative. Troponin I HS 28 -> 26. EKG with some TWI in III, aVF; there are no old EKG's to compare " to. CXR negative. CT Head negative. CT C Spine negative for fracture or traumatic subluxation. In the ED the patient was given 1L NS bolus. Patient is being admitted to the Observation Unit for syncope work up.   - Serial troponins q4h x 1 more  - Check TSH, Mg, Phos in AM  - Continuous telemetry  - Resting Echo in the morning  - Orthostatic vitals now and qshift  - Ambulate with assistance  - Continue NS at 100ml/hr  - Scalp staples removal in 7 days  - PT consult  - OT consult  - SW/CC consult    ##. CAD  ##. HLD  ##. HTN w/ Elevated BP  HR 60's-70's, 's-190's/80's-100's. Patients wife states that when he is taking his blood pressure medications his BP numbers are perfect in the 120's  - Continue with PTA ASA    - Continue with PTA Simvastatin    - Continue with PTA Amlodipine - will give dose now  - Hydralazine 5-10mg q4h prn SBP> 160, DBP> 100    ##. Glaucoma: - Continue with PTA Xalatan  - Continue with PTA Cosopt       ##. Depression: - Continue with PTA Prozac      Diet: Combination Diet Low Saturated Fat Na <2400mg Diet, No Caffeine Diet  DVT Prophylaxis: TEDs  Chahal Catheter: Not present  Central Lines: None  Cardiac Monitoring: ACTIVE order. Indication: Syncope- high cardiac risk (48 hours)  Code Status: No CPR- Do NOT Intubate    Clinically Significant Risk Factors Present on Admission                    # Cachexia: There is no height or weight on file to calculate BMI.        Disposition Plan         The patient's care was discussed with the Attending Physician, Dr. Whaley.    [facility]   ED Observation Service   [facility]  Securely message with the Vocera Web Console (learn more here)  Text page via [facility] Paging/Directory   ______________________________________________________________________    Chief Complaint   Fall     History is obtained from the patient, wife and daughter (Katarina)    History of Present Illness   Per ED Note:  "\"Enoc Vásquez is a 89 year old male with a PMH of dementia, HLD, HTN, CAD involving native coronary artery of native heart without angina pectoris, prostate cancer, dysthymia, and glaucoma who presents to the ED following a fall. Per patient's wife, he left his house and was walking around outside for 3 hours when a passerby found him lying on the street (fall was not witnessed). Patient is unstable when walking. Patient suffered a laceration to the occipital region of his head. Patient is unable to provide thorough history due to dementia and does not remember the fall. Says he is feeling okay right now and is not having any pain. Per EMS, patient has a history of pain.\"    Patient otherwise denies any headache, chest pain, changes in vision, dizziness, lightheadedness, SOB, joint pains.     Patients daughter is an ICU RN at [employer] and presents to bedside. She gives account of some of patients history. She indeed validates that patient has never been seen within the [facility] system. She states that his health has otherwise been fine and doctors at [facility]. His last visit to the ED was indeed 2013. She states he has otherwise done well at home except with recent worsening in memory for which he was seen at his PCP about a year ago and did \"ok\" with clinic dementia testing. He was referred to neuropsychiatry evaluation however this has not been done. She states he just had a fall about 3 months ago and scraped the side of his cheeks and did not present to any health care. He has not had any other falls to her recollection. His wife states she helps him with his 2 eye drops for glaucoma however as far as all his other meds he does manage himself and she doubts that he is compliant. Patients wife states that when he is taking his blood pressure medications his BP numbers are perfect in the 120's. Patients daughter states they have had some difficult discussions about patient needing a higher level of care with " declining memory and patient has been resistant to leaving his home and his wife has been resistant in the past to having a stranger in their home helping. Patients daughter states that patient does not use any assistive devices in walking. She states patient has been good at taking purposeful walks but occasionally seems to wonder off.    In the ED, HR 60's-70's, 's-190's/80's-100's, RR 8-15, SaO2 97-99% on RA, Temp 98.6  F. Labs show normal BMP with BUN 22.1, Cr 1.28 otherwise normal. CBC with H/H 12.9/38.6 otherwise normal. UA with trace ketones otherwise negative. Troponin I HS 28 -> 26. EKG with some TWI in III, aVF; there are no old EKG's to compare to. CXR negative. CT Head negative. CT C Spine negative for fracture or traumatic subluxation. In the ED the patient was given 1L NS bolus. Patient has an occipital laceration that was repaired with 2 staples. Patient is being admitted to the Observation Unit for syncope work up.     Review of Systems    All other ROS negative except those mentioned in above note.      Past Medical History    I have reviewed this patient's medical history and updated it with pertinent information if needed.   Past Medical History:   Diagnosis Date     Dementia (H)        Past Surgical History   I have reviewed this patient's surgical history and updated it with pertinent information if needed.  No past surgical history on file.    Social History   I have reviewed this patient's social history and updated it with pertinent information if needed.       Family History         Prior to Admission Medications   Prior to Admission Medications   Prescriptions Last Dose Informant Patient Reported? Taking?   FLUoxetine (PROZAC) 10 MG capsule   Yes No   Sig: Take 1 capsule by mouth every other day   Multiple Vitamins-Minerals (PRESERVISION AREDS 2) CAPS   Yes No   Sig: Take 1 tablet by mouth daily   amLODIPine (NORVASC) 10 MG tablet   Yes No   Sig: Take 1 tablet by mouth daily   aspirin  81 MG EC tablet   Yes No   Sig: Take 1 tablet by mouth daily   dorzolamide-timolol PF (COSOPT PF) 2-0.5 % opthalmic solutionh   Yes No   Sig: Apply 1 drop to eye 2 times daily   latanoprost (XALATAN) 0.005 % ophthalmic solution   Yes No   Sig: Apply 1 drop to eye At Bedtime   nitroGLYcerin (NITROSTAT) 0.4 MG sublingual tablet   Yes No   Sig: Place 1 tablet under the tongue as needed   simvastatin (ZOCOR) 20 MG tablet   Yes No   Sig: Take 1 tablet by mouth At Bedtime      Facility-Administered Medications: None     Allergies   Allergies   Allergen Reactions     Lisinopril-Hydrochlorothiazide Unknown       Physical Exam   Vital Signs: Temp: 97.8  F (36.6  C) Temp src: Oral BP: (!) 141/83 Pulse: 65   Resp: 18 SpO2: 97 % O2 Device: None (Room air)    Weight: 0 lbs 0 oz    Constitutional: awake, alert, cooperative, no apparent distress, and appears stated age  Eyes: Lids and lashes normal, pupils equal, round and reactive to light, extra ocular muscles intact, sclera clear, conjunctiva normal  ENT: Normocephalic, without obvious abnormality, atraumatic, sinuses nontender on palpation, external ears without lesions, oral pharynx with moist mucous membranes, tonsils without erythema or exudates, gums normal and good dentition.  Hematologic / Lymphatic: no cervical lymphadenopathy  Respiratory: No increased work of breathing, good air exchange, clear to auscultation bilaterally, no crackles or wheezing  Cardiovascular: Normal apical impulse, regular rate and rhythm, normal S1 and S2, no S3 or S4, and no murmur noted  GI: No scars, normal bowel sounds, soft, non-distended, non-tender, no masses palpated, no hepatosplenomegally  Skin: 2 staples on occipital scalp laceration. Small abrasion on right knee  Musculoskeletal: There is no redness, warmth, or swelling of the joints.  Full range of motion noted.  Motor strength is 5 out of 5 all extremities bilaterally.  Tone is normal.  Neurologic: Awake, alert, oriented to name,  place (knows its a hospital but not name) and time; knows his daughter and wife.  Cranial nerves II-XII are grossly intact.  Motor is 5 out of 5 bilaterally.   Neuropsychiatric: General: normal, calm and normal eye contact      Data   Data reviewed today: I reviewed all medications, new labs and imaging results over the last 24 hours. I personally reviewed   Recent Labs   Lab 06/26/22 2207 06/26/22  1603   WBC  --  7.9   HGB  --  12.9*   MCV  --  91   PLT  --  191   NA  --  141   POTASSIUM  --  3.9   CHLORIDE  --  104   CO2  --  24   BUN  --  22.1   CR  --  1.28*   ANIONGAP  --  13   BIANKA  --  9.2   GLC  --  115*   ALBUMIN 3.9  --    PROTTOTAL 7.0  --    BILITOTAL 0.9  --    ALKPHOS 89  --    ALT 17  --    AST 59*  --      Most Recent 3 CBC's:  Recent Labs   Lab Test 06/26/22  1603   WBC 7.9   HGB 12.9*   MCV 91        Most Recent 3 BMP's:  Recent Labs   Lab Test 06/26/22  1603 04/21/21  1107 06/10/20  1033    142 142   POTASSIUM 3.9 3.7 3.9   CHLORIDE 104 106 105   CO2 24 25 28   BUN 22.1 23 18   CR 1.28* 0.97 1.05   ANIONGAP 13 11 9   BIANKA 9.2 8.6 9.2   * 176* 95     Most Recent 2 LFT's:  Recent Labs   Lab Test 06/26/22 2207   AST 59*   ALT 17   ALKPHOS 89   BILITOTAL 0.9     Most Recent 3 INR's:No lab results found.  Most Recent 3 Hemoglobins:  Recent Labs   Lab Test 06/26/22  1603   HGB 12.9*     Most Recent 3 Troponin's:No lab results found.  Most Recent TSH and T4:  Recent Labs   Lab Test 06/26/22 2207   TSH 3.75     Most Recent 6 glucoses:  Recent Labs   Lab Test 06/26/22  1603 04/21/21  1107 06/10/20  1033 01/09/20  1009 11/27/18  0827   * 176* 95 100 104     Most Recent Urinalysis:  Recent Labs   Lab Test 06/26/22  1858   COLOR Light Yellow   APPEARANCE Clear   URINEGLC Negative   URINEBILI Negative   URINEKETONE Trace*   SG 1.012   UBLD Negative   URINEPH 6.5   PROTEIN Negative   NITRITE Negative   LEUKEST Negative   RBCU 1   WBCU 4     7.9    \    12.9 (L)    /    191   N 79     L N/A    141    104    22.1 /   ------------------------------------ 115 (H)   ALT N/A   AST N/A   AP N/A   ALB N/A   Ca 9.2  3.9    24    1.28 (H) \    % RETIC N/A    LDH N/A  Troponin N/A    BNP N/A    CK N/A  INR N/A   PTT N/A    D-dimer N/A    Fibrinogen N/A    Antithrombin N/A  Ferritin N/A  CRP N/A    IL-6 N/A  Recent Results (from the past 24 hour(s))   XR Chest Port 1 View    Narrative    EXAM: XR CHEST PORT 1 VIEW  6/26/2022 4:12 PM     HISTORY:  fall, trauma       COMPARISON:  None    FINDINGS: Single view of the chest.    Trachea is midline. The cardiomediastinal silhouette is within normal  limits. No significant pleural effusion or pneumothorax. No focal  airspace opacity. The visualized upper abdomen is unremarkable. No  acute osseous injury.      Impression    IMPRESSION: No acute airspace disease.    I have personally reviewed the examination and initial interpretation  and I agree with the findings.    JUNIE HARRIS MD         SYSTEM ID:  [id number]   CT Head w/o Contrast    Narrative    CT HEAD W/O CONTRAST 6/26/2022 4:40 PM    History: fall, head trauma     Comparison: None    Technique: Using multidetector thin collimation helical acquisition  technique, axial, coronal and sagittal CT images from the skull base  to the vertex were obtained without intravenous contrast.   (topogram) image(s) also obtained and reviewed.    Findings: There is no intracranial hemorrhage, mass effect, or midline  shift. Gray/white matter differentiation in both cerebral hemispheres  is preserved. Ventricles are proportionate to the cerebral sulci. The  basal cisterns are clear. Low-attenuation in the periventricular and  subcortical white matter, likely representing chronic small vessel  ischemic disease. Moderate diffuse cerebral volume loss.    Scalp hematoma overlying the right parietal bone. The bony calvaria  and the bones of the skull base are normal. The visualized portions of  the paranasal sinuses and  mastoid air cells are clear.      Impression    Impression: No acute intracranial pathology.     I have personally reviewed the examination and initial interpretation  and I agree with the findings.    CHI HOFFMANN MD         SYSTEM ID:  [id number]   Cervical spine CT w/o contrast    Narrative    CT CERVICAL SPINE W/O CONTRAST 6/26/2022 4:40 PM    Provided History: fall, possible c-sine injury, evaluate for c-spine  fracture    Comparison: None    Technique: Using multidetector thin collimation helical acquisition  technique, axial, coronal and sagittal CT images through the cervical  spine were obtained without intravenous contrast.     Findings:  Grade 1 anterolisthesis of C4 on C5 and C7 on T1. Accentuation of the  normal cervical lordosis. No acute fracture or subluxation. No  prevertebral edema. There is no disc height narrowing at any level.  The findings on a level by level basis are as follows:    C2-3:  No significant spinal canal or neural foraminal stenosis.    C3-4:  Bilateral uncinate spurring and facet arthropathy. Moderate  right and moderate-severe left neural foraminal stenosis. Mild spinal  canal stenosis.    C4-5:  Bilateral uncinate spurring and facet arthropathy. Moderate  right and mild left neural foraminal stenosis. No significant spinal  canal stenosis    C5-6:  Posterior disc osteophyte complex with bilateral uncinate  spurring and facet arthropathy. Moderate-severe right and moderate  left neural foraminal stenosis. Mild spinal canal stenosis.    C6-7:  Posterior disc osteophyte complex with bilateral uncinate  spurring and facet arthropathy. Severe right and moderate-severe left  neural foraminal stenosis. Mild-moderate spinal canal stenosis    C7-T1:  No significant spinal canal or neural foraminal stenosis     No abnormality of the paraspinous soft tissues.      Impression    Impression:  No acute fracture or traumatic subluxation of the  cervical spine.    I have personally reviewed  the examination and initial interpretation  and I agree with the findings.    CHI HOFFMANN MD         SYSTEM ID:  [id number]

## 2022-06-27 NOTE — PHARMACY-ADMISSION MEDICATION HISTORY
Admission Medication History Completed by Pharmacy    See [facility] Admission Navigator for allergy information, preferred outpatient pharmacy, prior to admission medications and immunization status.     Medication History Sources:     Medication list from Cindyra, family    Changes made to PTA medication list (reason):    Added: None    Deleted: None    Changed: Preservision changed from daily > BID    Additional Information:    Unable to confirm compliance due to patient's dementia    Prior to Admission medications    Medication Sig Last Dose Taking? Auth Provider Long Term End Date   amLODIPine (NORVASC) 10 MG tablet Take 1 tablet by mouth daily Unknown at Unknown time Yes Reported, Patient Yes    aspirin 81 MG EC tablet Take 1 tablet by mouth daily Unknown at Unknown time Yes Reported, Patient     dorzolamide-timolol PF (COSOPT PF) 2-0.5 % opthalmic solutionh Apply 1 drop to eye 2 times daily Unknown at Unknown time Yes Reported, Patient     FLUoxetine (PROZAC) 10 MG capsule Take 1 capsule by mouth every other day Unknown at Unknown time Yes Reported, Patient Yes    latanoprost (XALATAN) 0.005 % ophthalmic solution Apply 1 drop to eye At Bedtime Unknown at Unknown time Yes Reported, Patient Yes    Multiple Vitamins-Minerals (PRESERVISION AREDS 2) CAPS Take 1 tablet by mouth 2 times daily Unknown at Unknown time Yes Reported, Patient     nitroGLYcerin (NITROSTAT) 0.4 MG sublingual tablet Place 1 tablet under the tongue as needed Unknown at Unknown time Yes Reported, Patient Yes    simvastatin (ZOCOR) 20 MG tablet Take 1 tablet by mouth At Bedtime Unknown at Unknown time Yes Reported, Patient Yes        Date completed: 06/27/22    Medication history completed by: Janina [facility]

## 2022-06-27 NOTE — PROGRESS NOTES
Observation Goals     - Diagnostic tests and consults completed and resulted: Not met, PT/OT/SW pending  - No further episodes of syncope and any new arrhythmia addressed with controlled heart rates: Met   - Vital signs normal or at patient baseline and orthostatic vitals are normal and patient not lightheaded with standing : Met  - Tolerating oral intake to maintain hydration: Met  - Safe disposition plan has been identified: Not met

## 2022-06-27 NOTE — PLAN OF CARE
Observation Goals     - Diagnostic tests and consults completed and resulted: not met, PT/OT/SW pending  - No further episodes of syncope and any new arrhythmia addressed with controlled heart rates: met   - Vital signs normal or at patient baseline and orthostatic vitals are normal and patient not lightheaded with standing : not met, BP elevated at baseline. LAURA aware  - Tolerating oral intake to maintain hydration: In progress   - Safe disposition plan has been identified : not met    BP (!) 141/83   Pulse 65   Temp 97.8  F (36.6  C) (Oral)   Resp 18   SpO2 97%

## 2022-06-27 NOTE — PLAN OF CARE
Physical Therapy Discharge Summary    Reason for therapy discharge:    Discharged to home with outpatient therapy.    Progress towards therapy goal(s). See goals on Care Plan in [facility] electronic health record for goal details.  Goals partially met.  Barriers to achieving goals:   discharge from facility.    Therapy recommendation(s):    Continued therapy is recommended.  Rationale/Recommendations:  OP PT.

## 2022-06-27 NOTE — PLAN OF CARE
Observation Goals    - Diagnostic tests and consults completed and resulted: not met, PT/OT/SW pending  - No further episodes of syncope and any new arrhythmia addressed with controlled heart rates: met   - Vital signs normal or at patient baseline and orthostatic vitals are normal and patient not lightheaded with standing : not met, BP elevated at baseline. LAURA aware  - Tolerating oral intake to maintain hydration: In progress   - Safe disposition plan has been identified : not met

## 2022-06-27 NOTE — PROGRESS NOTES
Observation Goals     - Diagnostic tests and consults completed and resulted: Not met, OT/SW pending  - No further episodes of syncope and any new arrhythmia addressed with controlled heart rates: Met   - Vital signs normal or at patient baseline and orthostatic vitals are normal and patient not lightheaded with standing : Met  - Tolerating oral intake to maintain hydration: Met  - Safe disposition plan has been identified: Met

## 2022-06-27 NOTE — PROGRESS NOTES
"OBS PT Eval     06/27/22 0900   Quick Adds   Quick Adds Certification   Type of Visit Initial PT Evaluation   Living Environment   People in Home spouse   Living Environment Comments Patient is a very unreliable historian and unclear regarding home setup but per EMR home seems to have a few stairs to enter and multiple levels once inside.   Self-Care   Usual Activity Tolerance moderate   Current Activity Tolerance moderate   Regular Exercise Yes   Activity/Exercise Type walking   Equipment Currently Used at Home none   Fall history within last six months yes   Activity/Exercise/Self-Care Comment Again, patient is an unreliable historian but states that he often goes for walks IND in his neighborhood. EMR indicates that family has been thinking it would be best for patient to go to [facility]/memory care but patient has been hesitant and spouse has declined increased support at home from agency.   General Information   Onset of Illness/Injury or Date of Surgery 06/26/22   Referring Physician Juliette Rockwell PA   Patient/Family Therapy Goals Statement (PT) None stated   Pertinent History of Current Problem (include personal factors and/or comorbidities that impact the POC) Per EMR \" Enoc Vásquez is a 89 year old male admitted on 6/26/2022. He has a PMH of dementia, HLD, HTN, CAD involving native coronary artery of native heart without angina pectoris, prostate cancer, dysthymia, and glaucoma who presents to the ED following a fall.\"   General Observations activity: ambualte with assist   Cognition   Cognitive Status Comments Patient quite forgetful at baseline   Posture    Posture Kyphosis;Forward head position   Range of Motion (ROM)   Range of Motion ROM is WFL   Strength (Manual Muscle Testing)   Strength (Manual Muscle Testing) strength is WFL   Bed Mobility   Bed Mobility supine-sit;sit-supine   Supine-Sit [place] (Bed Mobility) independent   Sit-Supine [place] (Bed Mobility) independent   Transfers "   Transfers sit-stand transfer   Sit-Stand Transfer   Sit-Stand [place] (Transfers) independent   Gait/Stairs (Locomotion)   Robinson Level (Gait) contact guard   Comment, (Gait/Stairs) slightly unsteady gait, slow gait speed, poor pathfinding   Balance   Balance Comments IND sitting, CGA in stance for safety   Clinical Impression   Criteria for Skilled Therapeutic Intervention Yes, treatment indicated   PT Diagnosis (PT) impaired functional mobility   Influenced by the following impairments decreased activity tolerance   Functional limitations due to impairments gait, stairs   Clinical Presentation (PT Evaluation Complexity) Stable/Uncomplicated   Clinical Presentation Rationale clinical judgement   Clinical Decision Making (Complexity) low complexity   Planned Therapy Interventions (PT) balance training;bed mobility training;gait training;ROM (range of motion);stair training;stretching;transfer training   Risk & Benefits of therapy have been explained evaluation/treatment results reviewed;care plan/treatment goals reviewed;risks/benefits reviewed;current/potential barriers reviewed;participants voiced agreement with care plan;participants included;patient   PT Discharge Planning   PT Discharge Recommendation (DC Rec) home with outpatient physical therapy   PT Rationale for DC Rec Patient with slightly impaired dynamic balance but appears to be near functional baseline. Recommend patient d/c home with OP PT and will require 24 hour supervision due to baseline cognitive deficits.   PT Brief overview of current status SBA for hallway ambulation   Therapy Certification   Start of care date 06/27/22   Certification date from 06/27/22   Certification date to 07/11/22   Medical Diagnosis falls   Total Evaluation Time   Total Evaluation Time (Minutes) 10   Physical Therapy Goals   PT Frequency 3x/week   PT Predicted Duration/Target Date for Goal Attainment 07/11/22   PT Goals Bed Mobility;Transfers;Gait;Stairs    PT: Bed Mobility Independent;Goal Met   PT: Transfers Independent;Sit to/from stand   PT: Gait Independent;Greater than 200 feet   PT: Stairs Independent;Greater than 10 stairs     Garland Maxwell PT, DPT  Pager #[phone]

## 2022-06-27 NOTE — PROGRESS NOTES
Patient's After Visit Summary was reviewed with patient and/or N/A.   Patient verbalized understanding of After Visit Summary, recommended follow up and was given an opportunity to ask questions.   Discharge medications sent home with patient/family: Not applicable   Discharged with other:N/A

## 2022-06-27 NOTE — PLAN OF CARE
[facility]      OUTPATIENT PHYSICAL THERAPY EVALUATION  PLAN OF TREATMENT FOR OUTPATIENT REHABILITATION  (COMPLETE FOR INITIAL CLAIMS ONLY)  Patient's Last Name, First Name, M.I.  YOB: 1931  Enoc Vásquez                        Provider's Name  [facility] Medical Record No.  [id number]                               Onset Date:  06/26/22   Start of Care Date:  06/27/22      Type:     _X_PT   ___OT   ___SLP Medical Diagnosis:  falls                        PT Diagnosis:  impaired functional mobility   Visits from SOC:  1   _________________________________________________________________________________  Plan of Treatment/Functional Goals    Planned Interventions: balance training, bed mobility training, gait training, ROM (range of motion), stair training, stretching, transfer training     Goals: See Physical Therapy Goals on Care Plan in [employer] electronic health record.    Therapy Frequency: 3x/week  Predicted Duration of Therapy Intervention: 07/11/22  _________________________________________________________________________________    I CERTIFY THE NEED FOR THESE SERVICES FURNISHED UNDER        THIS PLAN OF TREATMENT AND WHILE UNDER MY CARE     (Physician co-signature of this document indicates review and certification of the therapy plan).              Certification date from: 06/27/22, Certification date to: 07/11/22    Referring Physician: Jluiette Rockwell PA            Initial Assessment        See Physical Therapy evaluation dated 06/27/22 in Epic electronic health record.

## 2022-06-27 NOTE — DISCHARGE SUMMARY
[facility]  Emergency Department Observation Unit Discharge Summary      Date of Admission:  6/26/2022  Date of Discharge:  6/27/2022  Discharging Provider: DUGLAS Mcclure CNP  Discharge Service: Emergency Department Observation Unit    Discharge Diagnoses   Fall  Scalp laceration    Follow-ups Needed After Discharge   Follow-up Appointments     Adult [facility] Follow-up and recommended labs and tests      Follow up with primary care provider, [facility],   within 7 days for hospital follow- up.  The following labs/tests are   recommended: CBC,BMP, Removal of staples and follow up for Ziopatch   results.      Appointments on [place] and/or [facility] (with [facility] or [facility]   provider or service). Call [phone] if you haven't heard regarding   these appointments within 7 days of discharge.         {Additional follow-up instructions/to-do's for PCP    : Hospital follow up, Staple removal.    Unresulted Labs Ordered in the Past 30 Days of this Admission     No orders found for last 31 day(s).      These results will be followed up by PCP    Discharge Disposition   Discharged to home  Condition at discharge: Stable    Hospital Course   Enoc Vásquez is a 89 year old male admitted on 6/26/2022. He has a PMH of dementia, HLD, HTN, CAD involving native coronary artery of native heart without angina pectoris, prostate cancer, dysthymia, and glaucoma who presents to the ED following a fall.     ##. Fall  ##. Scalp Laceration  Per patient's wife, patient left his house and was walking around outside for 3 hours when a passerby found him lying on the street (fall was not witnessed). Patient suffered a laceration to the occipital region of his head. Patient does not remember the fall. Patient has no complaints at the moment. Patient otherwise denies any headache, chest pain, changes in vision, dizziness, lightheadedness, SOB, joint pains. Daughter  "acknowledges patients recent worsening in memory for which he was seen at his PCP (Ameena) about a year ago and did \"ok\" with clinic dementia testing. Was referred to neuropsychiatry testing this has not been done. Had 1 other fall ~3 months ago and scraped the side of his cheeks. Patients daughter states they have had some difficult discussions about patient needing a higher level of care with declining memory and patient has been resistant to leaving his home and his wife has been resistant in the past to having a stranger in their home helping. Patients daughter states that patient does not use any assistive devices in walking. She states patient has been good at taking purposeful walks but occasionally seems to wonder off. UA with trace ketones otherwise negative. Troponin I HS 28 -> 26. EKG with some TWI in III, aVF; there are no old EKG's to compare to. CXR negative. CT Head negative. CT C Spine negative for fracture or traumatic subluxation. Echo EF with 55-60%  Troponin negative. Ziopatch placed at discharge. Patient was seen by PT. Reported patient was at baseline status recommending home care.  Scalp staples removal in 7 days    ##. CAD  ##. HLD  ##. HTN w/ Elevated BP  /77. Patients wife states that when he is taking his blood pressure medications his BP numbers are perfect in the 120's  - Continue with PTA ASA    - Continue with PTA Simvastatin    - Continue with PTA Amlodipine    ##. Glaucoma: - Continue with PTA Xalatan  - Continue with PTA Cosopt          ##. Depression: - Continue with PTA Prozac     Consultations This Hospital Stay   MEDICATION HISTORY IP PHARMACY CONSULT  CARE MANAGEMENT / SOCIAL WORK IP CONSULT  PHYSICAL THERAPY ADULT IP CONSULT  OCCUPATIONAL THERAPY ADULT IP CONSULT  NURSING TO CONSULT FOR VASCULAR ACCESS CARE IP CONSULT    Code Status   No CPR- Do NOT Intubate    Time Spent on this Encounter   I, Eunice Pablo, APRN CNP, personally saw the patient today and spent less " than or equal to 30 minutes discharging this patient.       DUGLAS Mcclure CNP  [facility] [address] [facility]  [address] [address]  Phone: [phone]  Fax: [phone]  ______________________________________________________________________    Physical Exam   Vital Signs: Temp: 97.6  F (36.4  C) Temp src: Oral BP: (!) 141/77 Pulse: 66   Resp: 18 SpO2: 97 % O2 Device: None (Room air)    Weight: 0 lbs 0 oz  Constitutional: awake, alert, cooperative, no apparent distress, and appears stated age  Eyes: Lids and lashes normal, pupils equal, round and reactive to light, extra ocular muscles intact, sclera clear, conjunctiva normal  ENT: Normocephalic, without obvious abnormality, atraumatic, sinuses nontender on palpation, external ears without lesions, oral pharynx with moist mucous membranes, tonsils without erythema or exudates, gums normal and good dentition.  Hematologic / Lymphatic: no cervical lymphadenopathy  Respiratory: No increased work of breathing, good air exchange, clear to auscultation bilaterally, no crackles or wheezing  Cardiovascular: Normal apical impulse, regular rate and rhythm, normal S1 and S2, no S3 or S4, and no murmur noted  GI: No scars, normal bowel sounds, soft, non-distended, non-tender, no masses palpated, no hepatosplenomegally  Skin: 2 staples on occipital scalp laceration. Small abrasion on right knee  Musculoskeletal: There is no redness, warmth, or swelling of the joints.  Full range of motion noted.  Motor strength is 5 out of 5 all extremities bilaterally.  Tone is normal.  Neurologic: Awake, alert, oriented to name, place (knows its a hospital but not name) and time; knows his daughter and wife.  Cranial nerves II-XII are grossly intact.  Motor is 5 out of 5 bilaterally.   Neuropsychiatric: General: normal, calm and normal eye contact       Primary Care Physician   [facility]    Discharge Orders       Reason for your hospital stay    Fall  Scalp laceration     Activity    Your activity upon discharge: activity as tolerated     Adult [email or web address] Follow-up and recommended labs and tests    Follow up with primary care provider, [facility], within 7 days for hospital follow- up.  The following labs/tests are recommended: CBC,BMP, Removal of staples and follow up for Ziopatch results.      Appointments on [place] and/or [facility] (with [email or web address] or [facility] provider or service). Call [phone] if you haven't heard regarding these appointments within 7 days of discharge.     When to contact your care team    Call [phone] if any of these happen:     Trouble breathing    Confusion    Trouble waking up    Fainting or loss of consciousness    Fast or very slow heart rate    Seizure    Speech or vision problems    Arm or leg weakness    Trouble walking or talking, loss of balance, numbness or weakness in one side of your body, facial droop    Call your healthcare provider right away if any of these happen:    Another unexplained fall    Dizziness    Severe headache    Nausea and vomiting    Blood in vomit, stools (black or red color)     Discharge Instructions    You were admitted to the ED observation unit at the [facility] following a fall. You suffered a laceration to the back of your head requiring staples.  Your labs were stable. Your urine was negative for a urinary tract infection.  Your chest xray was negative for infection.  CT Head and CT C Spine negative for fracture or traumatic subluxation. Troponins (a lab test to check if there were damage to the heart tissue) were checked and these were negative. An Echo of your heart was completed and showed normal heart function.  A Zio patch was placed at discharge. This is to monitor your heart for the next 7 days to monitor your heart during your normal activities. Follow up with your primary care doctor for these results. You were seen by physical therapy  who recommended home care. Recommend to see your primary care doctor for hospital follow up and Scalp staples removal in 7 days.     Diet    Follow this diet upon discharge: Cardiac diet       Significant Results and Procedures   Results for orders placed or performed during the hospital encounter of 06/26/22   XR Chest Port 1 View    Narrative    EXAM: XR CHEST PORT 1 VIEW  6/26/2022 4:12 PM     HISTORY:  fall, trauma       COMPARISON:  None    FINDINGS: Single view of the chest.    Trachea is midline. The cardiomediastinal silhouette is within normal  limits. No significant pleural effusion or pneumothorax. No focal  airspace opacity. The visualized upper abdomen is unremarkable. No  acute osseous injury.      Impression    IMPRESSION: No acute airspace disease.    I have personally reviewed the examination and initial interpretation  and I agree with the findings.    JUNIE HARRIS MD         SYSTEM ID:  [id number]   CT Head w/o Contrast    Narrative    CT HEAD W/O CONTRAST 6/26/2022 4:40 PM    History: fall, head trauma     Comparison: None    Technique: Using multidetector thin collimation helical acquisition  technique, axial, coronal and sagittal CT images from the skull base  to the vertex were obtained without intravenous contrast.   (topogram) image(s) also obtained and reviewed.    Findings: There is no intracranial hemorrhage, mass effect, or midline  shift. Gray/white matter differentiation in both cerebral hemispheres  is preserved. Ventricles are proportionate to the cerebral sulci. The  basal cisterns are clear. Low-attenuation in the periventricular and  subcortical white matter, likely representing chronic small vessel  ischemic disease. Moderate diffuse cerebral volume loss.    Scalp hematoma overlying the right parietal bone. The bony calvaria  and the bones of the skull base are normal. The visualized portions of  the paranasal sinuses and mastoid air cells are clear.      Impression     Impression: No acute intracranial pathology.     I have personally reviewed the examination and initial interpretation  and I agree with the findings.    CHI HOFFMANN MD         SYSTEM ID:  [id number]   Cervical spine CT w/o contrast    Narrative    CT CERVICAL SPINE W/O CONTRAST 6/26/2022 4:40 PM    Provided History: fall, possible c-sine injury, evaluate for c-spine  fracture    Comparison: None    Technique: Using multidetector thin collimation helical acquisition  technique, axial, coronal and sagittal CT images through the cervical  spine were obtained without intravenous contrast.     Findings:  Grade 1 anterolisthesis of C4 on C5 and C7 on T1. Accentuation of the  normal cervical lordosis. No acute fracture or subluxation. No  prevertebral edema. There is no disc height narrowing at any level.  The findings on a level by level basis are as follows:    C2-3:  No significant spinal canal or neural foraminal stenosis.    C3-4:  Bilateral uncinate spurring and facet arthropathy. Moderate  right and moderate-severe left neural foraminal stenosis. Mild spinal  canal stenosis.    C4-5:  Bilateral uncinate spurring and facet arthropathy. Moderate  right and mild left neural foraminal stenosis. No significant spinal  canal stenosis    C5-6:  Posterior disc osteophyte complex with bilateral uncinate  spurring and facet arthropathy. Moderate-severe right and moderate  left neural foraminal stenosis. Mild spinal canal stenosis.    C6-7:  Posterior disc osteophyte complex with bilateral uncinate  spurring and facet arthropathy. Severe right and moderate-severe left  neural foraminal stenosis. Mild-moderate spinal canal stenosis    C7-T1:  No significant spinal canal or neural foraminal stenosis     No abnormality of the paraspinous soft tissues.      Impression    Impression:  No acute fracture or traumatic subluxation of the  cervical spine.    I have personally reviewed the examination and initial interpretation  and I  agree with the findings.    CHI HOFFMANN MD         SYSTEM ID:  [id number]   Echocardiogram Complete     Value    LVEF  55-60%    Narrative    [id number]  [id number]  [id number]  [id number]^GUNNAR^HUGH^DEVYN     [facility]  Echocardiography Laboratory  [address] [place] [id number]     Name: REID JACOB  MRN: [id number]  : 1931  Study Date: 2022 09:04 AM  Age: 89 yrs  Gender: Male  Patient Location: [facility]  Reason For Study: Syncope  Ordering Physician: HUGH MAHER  Performed By: Leeanne Mcgee     BSA: 1.8 m2  Height: 67 in  Weight: 150 lb  HR: 69  ______________________________________________________________________________  Procedure  Complete Portable Echo Adult.  ______________________________________________________________________________  Interpretation Summary  Global and regional left ventricular function is normal with an EF of 55-60%.  Right ventricular function, chamber size, wall motion, and thickness are  normal.  Pulmonary artery systolic pressure cannot be assessed.  The inferior vena cava is normal.  No pericardial effusion is present.  Previous study not available for comparison.  ______________________________________________________________________________  Left Ventricle  Global and regional left ventricular function is normal with an EF of 55-60%.  Left ventricular wall thickness is normal. Left ventricular size is normal.  Grade I or early diastolic dysfunction. No regional wall motion abnormalities  are seen.     Right Ventricle  Right ventricular function, chamber size, wall motion, and thickness are  normal.     Atria  The right atria appears normal. Mild left atrial enlargement is present. The  atrial septum is intact as assessed by color Doppler .     Mitral Valve  The mitral valve is normal. Trace mitral insufficiency is present.     Aortic Valve  The valve leaflets are not well visualized. Mild aortic insufficiency  is  present.     Tricuspid Valve  The tricuspid valve is normal. Trace to mild tricuspid insufficiency is  present. Pulmonary artery systolic pressure cannot be assessed.     Pulmonic Valve  The pulmonic valve is normal. Trace pulmonic insufficiency is present.     Vessels  The thoracic aorta is normal. The pulmonary artery and bifurcation cannot be  assessed. The inferior vena cava is normal.     Pericardium  No pericardial effusion is present.     Compared to Previous Study  Previous study not available for comparison.  ______________________________________________________________________________  MMode/2D Measurements & Calculations  IVSd: 0.96 cm  LVIDd: 5.6 cm  LVIDs: 4.1 cm  LVPWd: 0.83 cm  FS: 26.9 %  LV mass(C)d: 191.3 grams  LV mass(C)dI: 106.9 grams/m2  asc Aorta Diam: 3.4 cm  LVOT diam: 2.4 cm  LVOT area: 4.4 cm2  LA Volume Index (BP): 36.5 ml/m2  RWT: 0.29  TAPSE: 1.9 cm     Doppler Measurements & Calculations  MV E max eduard: 42.4 cm/sec  MV A max eduard: 103.8 cm/sec  MV E/A: 0.41  MV dec time: 0.19 sec  E/E' av.4  Lateral E/e': 15.5  Medial E/e': 7.3     ______________________________________________________________________________  Report approved by: Ramone Berry 2022 10:33 AM               Discharge Medications   Current Discharge Medication List      CONTINUE these medications which have NOT CHANGED    Details   amLODIPine (NORVASC) 10 MG tablet Take 1 tablet by mouth daily      aspirin 81 MG EC tablet Take 1 tablet by mouth daily      dorzolamide-timolol PF (COSOPT PF) 2-0.5 % opthalmic solutionh Apply 1 drop to eye 2 times daily      FLUoxetine (PROZAC) 10 MG capsule Take 1 capsule by mouth every other day      latanoprost (XALATAN) 0.005 % ophthalmic solution Apply 1 drop to eye At Bedtime      Multiple Vitamins-Minerals (PRESERVISION AREDS 2) CAPS Take 1 tablet by mouth 2 times daily      nitroGLYcerin (NITROSTAT) 0.4 MG sublingual tablet Place 1 tablet under the tongue as needed       simvastatin (ZOCOR) 20 MG tablet Take 1 tablet by mouth At Bedtime           Allergies   Allergies   Allergen Reactions     Lisinopril-Hydrochlorothiazide Unknown

## 2022-06-27 NOTE — CONSULTS
Care Management Initial Consult    General Information  Assessment completed with: Spouse or significant other, Children, wife and daughterKatarina    Primary Care Provider verified and updated as needed: Yes   Readmission within the last 30 days:        Reason for Consult: discharge planning    Communication Assessment  Patient's communication style: spoken language (English or Bilingual)    Hearing Difficulty or Deaf: no      Cognitive  Cognitive/Neuro/Behavioral: .WDL except  Level of Consciousness: confused  Arousal Level: opens eyes spontaneously  Orientation: disoriented to, situation, time  Mood/Behavior: hyperactive (agitated, impulsive)  Best Language: 0 - No aphasia  Speech: garbled, slow    Living Environment:   People in home: spouse     Current living Arrangements: house      Able to return to prior arrangements: yes       Family/Social Support:  Care provided by: spouse/significant other, self, child(deng)  Provides care for:    Marital Status:   Wife, Children, Neighbor          Description of Support System: Supportive, Involved       Current Resources:   Patient receiving home care services: No     Community Resources: None  Equipment currently used at home: none  Supplies currently used at home: None    Employment/Financial:  Financial Concerns: No concerns identified      Functional Status:  Prior to admission patient needed assistance:   Dependent ADLs:: Independent  Dependent IADLs:: Medication Management    Additional Information:  Pt admitted to observation to workup possible syncopal episode after pt was found following a fall while he was out walking. This writer received update from NP and SW that pt was evaluated and PT recommending discharge to home with  PT/OT (PT note not yet completed.) Per NP, pt medically ready for discharge home today.     This writer met with pt, daughter Katarina and wife, Rajwinder at bedside. Pt lives with his wife who is home with him 24/7, but pt  sometimes goes outside and for walks without supervision. Rajwinder stated pt's dementia has been progressing and their neighbor have assist with guiding him back home on multiple occasions. Rajwinder stated that up until this point they have not been interested in in home services or looking into [facility]ANT[facility]/LTC. Both Rajwinder and Katarina stated that they are now interested in pursing extra support in the home to see if pt can remain home safely. Both seem very aware that pt likely will need placement soon in some sort of memory care setting. Rajwinder stated she has a lot of friends who have loved ones in memory care and is aware of the process. This writer discussed utilizing resources such as the [facility] Linkage Line to start looking into placement options. Pt is schedule for further neuro cognitive assessment as an outpatient.     CHRIS provided pt's daughter with a list of private duty home care agencies and Katarina is planning to call to check availability/cost. This writer discussed recommendation for home care and pt's daughter and wife agreeable to home care referrals for RN/PT/OT/HHA/CHRIS. This writer reviewed Medicare.gov list and obtained choices including: [facility], [facility], [facility] (now [facility]) and [facility].    This writer sent the following home care referrals:  [facility] (faxed referral and left voicemail)  [facility] (faxed referral and left voicemail)  [facility] (referral sent via email)    1500:  This writer received an update from [place] clinical liason for [facility] confirming they can accept referral for RN/PT/OT/EVELYNA and CHRIS.   This writer also scheduled pt for new PCP follow up at [facility] since his PCP has moved. This writer called and confirmed the clinic providers will continue to follow home care orders until he establishes with new provider.      [facility]   Phone: [phone]     You are scheduled to  establish a new PCP within their clinic since your primary has moved.   Appointment on 7/5/2022 at 9:15am with Denise Hanna PA-C for post hospitalization follow up.     [facility]   Phone: [phone]   Fax: [phone]     For RN evaluation post hospitalization. Assess vital signs, respiratory and cardiac status, activity tolerance, hydration, nutritional status, med setup and management.   PT/OT eval and treat for deconditioning, strengthening and endurance.   HHA eval for assistance with ADLs.   SW eval and assistance with community resources, possible transition to memory care in the near future.     1536: This writer called pt's daughter Katarina to update and he had already discharged. This writer provided contact information for [facility] and the PCP follow up information, Katarina in agreement with plan.     CC will continue to monitor patient's medical condition and progress  towards discharge.  Cynthia Dumont RN BSN  6C Unit Care Coordinator  Phone number: [phone]  Pager: [phone]

## 2022-06-27 NOTE — PLAN OF CARE
Occupational Therapy: Orders received. Chart reviewed and discussed with care team.? Occupational Therapy not indicated due to no acute OT needs (patient at baseline for activities of daily living, cognition and mobility transfers).? Defer discharge recommendations to Physical Therapy.? Will complete orders.

## 2022-06-28 DIAGNOSIS — Z71.89 OTHER SPECIFIED COUNSELING: ICD-10-CM

## 2022-06-30 ENCOUNTER — PATIENT OUTREACH (OUTPATIENT)
Dept: CARE COORDINATION | Facility: CLINIC | Age: 87
End: 2022-06-30

## 2022-06-30 NOTE — PROGRESS NOTES
Clinic Care Coordination Contact  Care Team Conversations    Patient identified for care management outreach, however [facility] RN staff has already followed up with patient to ensure they are following up with PCP and have needs and resources met. Clinic RN will refer back to CHRIS VALERIO if needed/appropriate.    TANNA Topete  Social Work Care Coordinator - [facility]  Care Coordination  [email or web address]  Cell Phone: [phone]  Gender pronouns: she/her  Employed by [employer]

## 2022-07-05 ENCOUNTER — LAB REQUISITION (OUTPATIENT)
Dept: LAB | Facility: CLINIC | Age: 87
End: 2022-07-05

## 2022-07-05 DIAGNOSIS — I10 ESSENTIAL (PRIMARY) HYPERTENSION: ICD-10-CM

## 2022-07-05 LAB
ANION GAP SERPL CALCULATED.3IONS-SCNC: 12 MMOL/L (ref 7–15)
BUN SERPL-MCNC: 25.1 MG/DL (ref 8–23)
CALCIUM SERPL-MCNC: 9.3 MG/DL (ref 8.2–9.6)
CHLORIDE SERPL-SCNC: 105 MMOL/L (ref 98–107)
CREAT SERPL-MCNC: 1.08 MG/DL (ref 0.67–1.17)
DEPRECATED HCO3 PLAS-SCNC: 23 MMOL/L (ref 22–29)
GFR SERPL CREATININE-BSD FRML MDRD: 65 ML/MIN/1.73M2
GLUCOSE SERPL-MCNC: 119 MG/DL (ref 70–99)
POTASSIUM SERPL-SCNC: 4.1 MMOL/L (ref 3.4–5.3)
SODIUM SERPL-SCNC: 140 MMOL/L (ref 136–145)

## 2022-07-05 PROCEDURE — 80048 BASIC METABOLIC PNL TOTAL CA: CPT | Performed by: PHYSICIAN ASSISTANT

## 2023-12-05 ENCOUNTER — LAB REQUISITION (OUTPATIENT)
Dept: LAB | Facility: CLINIC | Age: 88
End: 2023-12-05

## 2023-12-05 DIAGNOSIS — E78.2 MIXED HYPERLIPIDEMIA: ICD-10-CM

## 2023-12-05 DIAGNOSIS — I10 ESSENTIAL (PRIMARY) HYPERTENSION: ICD-10-CM

## 2023-12-05 PROCEDURE — 80053 COMPREHEN METABOLIC PANEL: CPT | Performed by: PHYSICIAN ASSISTANT

## 2023-12-05 PROCEDURE — 80061 LIPID PANEL: CPT | Performed by: PHYSICIAN ASSISTANT

## 2023-12-06 LAB
ALBUMIN SERPL BCG-MCNC: 3.9 G/DL (ref 3.5–5.2)
ALP SERPL-CCNC: 86 U/L (ref 40–150)
ALT SERPL W P-5'-P-CCNC: 10 U/L (ref 0–70)
ANION GAP SERPL CALCULATED.3IONS-SCNC: 11 MMOL/L (ref 7–15)
AST SERPL W P-5'-P-CCNC: 13 U/L (ref 0–45)
BILIRUB SERPL-MCNC: 0.4 MG/DL
BUN SERPL-MCNC: 23.8 MG/DL (ref 8–23)
CALCIUM SERPL-MCNC: 9.1 MG/DL (ref 8.2–9.6)
CHLORIDE SERPL-SCNC: 104 MMOL/L (ref 98–107)
CHOLEST SERPL-MCNC: 185 MG/DL
CREAT SERPL-MCNC: 0.96 MG/DL (ref 0.67–1.17)
DEPRECATED HCO3 PLAS-SCNC: 24 MMOL/L (ref 22–29)
EGFRCR SERPLBLD CKD-EPI 2021: 74 ML/MIN/1.73M2
FASTING STATUS PATIENT QL REPORTED: ABNORMAL
GLUCOSE SERPL-MCNC: 106 MG/DL (ref 70–99)
HDLC SERPL-MCNC: 38 MG/DL
LDLC SERPL CALC-MCNC: 107 MG/DL
NONHDLC SERPL-MCNC: 147 MG/DL
POTASSIUM SERPL-SCNC: 3.7 MMOL/L (ref 3.4–5.3)
PROT SERPL-MCNC: 7 G/DL (ref 6.4–8.3)
SODIUM SERPL-SCNC: 139 MMOL/L (ref 135–145)
TRIGL SERPL-MCNC: 202 MG/DL

## 2024-04-26 ENCOUNTER — TRANSFERRED RECORDS (OUTPATIENT)
Dept: HEALTH INFORMATION MANAGEMENT | Facility: CLINIC | Age: 89
End: 2024-04-26
Payer: COMMERCIAL

## 2024-04-26 ENCOUNTER — MEDICAL CORRESPONDENCE (OUTPATIENT)
Dept: HEALTH INFORMATION MANAGEMENT | Facility: CLINIC | Age: 89
End: 2024-04-26
Payer: COMMERCIAL

## 2024-04-26 ENCOUNTER — TRANSCRIBE ORDERS (OUTPATIENT)
Dept: OTHER | Age: 89
End: 2024-04-26

## 2024-04-26 DIAGNOSIS — K40.20 BILATERAL INGUINAL HERNIA WITHOUT OBSTRUCTION OR GANGRENE, RECURRENCE NOT SPECIFIED: Primary | ICD-10-CM

## 2024-05-06 ENCOUNTER — TRANSFERRED RECORDS (OUTPATIENT)
Dept: HEALTH INFORMATION MANAGEMENT | Facility: CLINIC | Age: 89
End: 2024-05-06

## 2025-04-10 ENCOUNTER — LAB REQUISITION (OUTPATIENT)
Dept: LAB | Facility: CLINIC | Age: OVER 89
End: 2025-04-10
Payer: COMMERCIAL

## 2025-04-10 DIAGNOSIS — Z11.52 ENCOUNTER FOR SCREENING FOR COVID-19: ICD-10-CM

## 2025-04-10 PROCEDURE — 87635 SARS-COV-2 COVID-19 AMP PRB: CPT | Mod: ORL | Performed by: NURSE PRACTITIONER

## 2025-04-11 LAB — SARS-COV-2 RNA RESP QL NAA+PROBE: NEGATIVE

## 2025-04-14 ENCOUNTER — LAB REQUISITION (OUTPATIENT)
Dept: LAB | Facility: CLINIC | Age: OVER 89
End: 2025-04-14
Payer: COMMERCIAL

## 2025-04-14 DIAGNOSIS — Z11.52 ENCOUNTER FOR SCREENING FOR COVID-19: ICD-10-CM

## 2025-04-14 PROCEDURE — 87635 SARS-COV-2 COVID-19 AMP PRB: CPT | Mod: ORL | Performed by: INTERNAL MEDICINE

## 2025-04-15 LAB — SARS-COV-2 RNA RESP QL NAA+PROBE: NEGATIVE
